# Patient Record
Sex: FEMALE | Race: WHITE | Employment: OTHER | URBAN - METROPOLITAN AREA
[De-identification: names, ages, dates, MRNs, and addresses within clinical notes are randomized per-mention and may not be internally consistent; named-entity substitution may affect disease eponyms.]

---

## 2017-09-26 ENCOUNTER — ESTABLISHED COMPREHENSIVE EXAM (OUTPATIENT)
Dept: URBAN - METROPOLITAN AREA CLINIC 36 | Facility: CLINIC | Age: 72
End: 2017-09-26

## 2017-09-26 DIAGNOSIS — H25.813: ICD-10-CM

## 2017-09-26 DIAGNOSIS — H52.7: ICD-10-CM

## 2017-09-26 PROCEDURE — 92014 COMPRE OPH EXAM EST PT 1/>: CPT

## 2017-09-26 PROCEDURE — 92310-1 LEVEL 1 CONTACT LENS MANAGEMENT

## 2017-09-26 PROCEDURE — G8427 DOCREV CUR MEDS BY ELIG CLIN: HCPCS

## 2017-09-26 PROCEDURE — G8785 BP SCRN NO PERF AT INTERVAL: HCPCS

## 2017-09-26 PROCEDURE — 92015 DETERMINE REFRACTIVE STATE: CPT

## 2017-09-26 PROCEDURE — 92310PEW PREMIUM SPECIALTY EXTENDED WEAR

## 2017-09-26 ASSESSMENT — VISUAL ACUITY
OS_CC: J2
OD_SC: J10
OS_SC: 20/40
OD_PH: 20/50
OS_SC: J12
OD_SC: 20/40-2
OD_CC: 20/100
OS_CC: 20/50
OD_CC: J3

## 2017-09-26 ASSESSMENT — TONOMETRY
OD_IOP_MMHG: 19
OS_IOP_MMHG: 18

## 2018-09-25 ENCOUNTER — ESTABLISHED COMPREHENSIVE EXAM (OUTPATIENT)
Dept: URBAN - METROPOLITAN AREA CLINIC 36 | Facility: CLINIC | Age: 73
End: 2018-09-25

## 2018-09-25 DIAGNOSIS — H25.812: ICD-10-CM

## 2018-09-25 DIAGNOSIS — H25.811: ICD-10-CM

## 2018-09-25 PROCEDURE — 92014 COMPRE OPH EXAM EST PT 1/>: CPT

## 2018-09-25 PROCEDURE — G8785 BP SCRN NO PERF AT INTERVAL: HCPCS

## 2018-09-25 PROCEDURE — G8427 DOCREV CUR MEDS BY ELIG CLIN: HCPCS

## 2018-09-25 PROCEDURE — G9902 PT SCRN TBCO AND ID AS USER: HCPCS

## 2018-09-25 PROCEDURE — 92310-1 LEVEL 1 CONTACT LENS MANAGEMENT

## 2018-09-25 PROCEDURE — 92310SDW STANDARD DAILY WEAR

## 2018-09-25 PROCEDURE — 92015 DETERMINE REFRACTIVE STATE: CPT

## 2018-09-25 ASSESSMENT — VISUAL ACUITY
OS_SC: J10
OS_CC: J2
OD_PH: 20/50
OD_SC: 20/60
OS_SC: 20/40
OS_PH: 20/40
OD_CC: 20/100
OD_CC: J2
OD_SC: J10
OS_CC: 20/60

## 2018-09-25 ASSESSMENT — TONOMETRY
OS_IOP_MMHG: 16
OD_IOP_MMHG: 16

## 2018-10-02 ENCOUNTER — CONTACT LENS FOLLOW UP (OUTPATIENT)
Dept: URBAN - METROPOLITAN AREA CLINIC 36 | Facility: CLINIC | Age: 73
End: 2018-10-02

## 2018-10-02 DIAGNOSIS — H52.7: ICD-10-CM

## 2018-10-02 PROCEDURE — 92310F

## 2018-10-02 ASSESSMENT — VISUAL ACUITY
OD_CC: J1
OS_CC: J3
OD_CC: 20/40
OS_CC: 20/25

## 2019-10-14 ENCOUNTER — ESTABLISHED COMPREHENSIVE EXAM (OUTPATIENT)
Dept: URBAN - METROPOLITAN AREA CLINIC 36 | Facility: CLINIC | Age: 74
End: 2019-10-14

## 2019-10-14 DIAGNOSIS — H25.811: ICD-10-CM

## 2019-10-14 DIAGNOSIS — H25.812: ICD-10-CM

## 2019-10-14 DIAGNOSIS — H52.7: ICD-10-CM

## 2019-10-14 DIAGNOSIS — Z97.3: ICD-10-CM

## 2019-10-14 PROCEDURE — 92015 DETERMINE REFRACTIVE STATE: CPT

## 2019-10-14 PROCEDURE — 92014 COMPRE OPH EXAM EST PT 1/>: CPT

## 2019-10-14 PROCEDURE — 92310PEW PREMIUM SPECIALTY EXTENDED WEAR

## 2019-10-14 PROCEDURE — 92310-1 LEVEL 1 CONTACT LENS MANAGEMENT

## 2019-10-14 ASSESSMENT — VISUAL ACUITY
OD_SC: >J10
OS_PH: 20/25-2
OS_SC: 20/30-2
OD_PH: 20/20-2
OS_SC: >J10
OD_SC: 20/40

## 2019-10-14 ASSESSMENT — TONOMETRY
OD_IOP_MMHG: 16
OS_IOP_MMHG: 18

## 2020-03-05 ENCOUNTER — EST. PATIENT EMERGENCY (OUTPATIENT)
Dept: URBAN - METROPOLITAN AREA CLINIC 36 | Facility: CLINIC | Age: 75
End: 2020-03-05

## 2020-03-05 DIAGNOSIS — B02.39: ICD-10-CM

## 2020-03-05 PROCEDURE — 92012 INTRM OPH EXAM EST PATIENT: CPT

## 2020-03-05 ASSESSMENT — TONOMETRY
OD_IOP_MMHG: 16
OS_IOP_MMHG: 16

## 2020-03-05 ASSESSMENT — VISUAL ACUITY
OD_SC: 20/40-1
OS_SC: 20/50
OD_PH: 20/30
OS_PH: 20/30

## 2020-10-19 ENCOUNTER — ESTABLISHED COMPREHENSIVE EXAM (OUTPATIENT)
Dept: URBAN - METROPOLITAN AREA CLINIC 36 | Facility: CLINIC | Age: 75
End: 2020-10-19

## 2020-10-19 DIAGNOSIS — H52.7: ICD-10-CM

## 2020-10-19 DIAGNOSIS — H25.811: ICD-10-CM

## 2020-10-19 DIAGNOSIS — H25.812: ICD-10-CM

## 2020-10-19 DIAGNOSIS — Z97.3: ICD-10-CM

## 2020-10-19 PROCEDURE — 92310-1 LEVEL 1 CONTACT LENS MANAGEMENT

## 2020-10-19 PROCEDURE — 92310PEW PREMIUM SPECIALTY EXTENDED WEAR

## 2020-10-19 PROCEDURE — 92015 DETERMINE REFRACTIVE STATE: CPT

## 2020-10-19 PROCEDURE — 92014 COMPRE OPH EXAM EST PT 1/>: CPT

## 2020-10-19 ASSESSMENT — TONOMETRY
OS_IOP_MMHG: 16
OD_IOP_MMHG: 16

## 2020-10-19 ASSESSMENT — VISUAL ACUITY
OD_SC: 20/40-1
OS_SC: 20/40
OD_SC: J>10
OS_SC: J>10
OS_PH: 20/30
OD_PH: 20/30-1

## 2020-11-10 ENCOUNTER — CATARACT CONSULT (OUTPATIENT)
Dept: URBAN - METROPOLITAN AREA CLINIC 43 | Facility: CLINIC | Age: 75
End: 2020-11-10

## 2020-11-10 DIAGNOSIS — H25.811: ICD-10-CM

## 2020-11-10 DIAGNOSIS — H18.593: ICD-10-CM

## 2020-11-10 DIAGNOSIS — H04.123: ICD-10-CM

## 2020-11-10 DIAGNOSIS — H25.812: ICD-10-CM

## 2020-11-10 DIAGNOSIS — H18.513: ICD-10-CM

## 2020-11-10 PROCEDURE — 92286 ANT SGM IMG I&R SPECLR MIC: CPT

## 2020-11-10 PROCEDURE — 99214 OFFICE O/P EST MOD 30 MIN: CPT

## 2020-11-10 PROCEDURE — 92025-2 CORNEAL TOPOGRAPHY, PT

## 2020-11-10 PROCEDURE — 92136TC INTERFEROMETRY - TECHNICAL COMPONENT

## 2020-11-10 RX ORDER — MOXIFLOXACIN HYDROCHLORIDE 5 MG/ML: 1 SOLUTION/ DROPS OPHTHALMIC

## 2020-11-10 RX ORDER — DUREZOL 0.5 MG/ML: 1 EMULSION OPHTHALMIC TWICE A DAY

## 2020-11-10 RX ORDER — NEPAFENAC 3 MG/ML: 1 SUSPENSION/ DROPS OPHTHALMIC ONCE A DAY

## 2020-11-10 ASSESSMENT — TONOMETRY
OD_IOP_MMHG: 15
OS_IOP_MMHG: 14

## 2020-11-10 ASSESSMENT — VISUAL ACUITY
OS_SC: J12
OD_BAT: 20/200
OS_AM: 20/20
OD_CC: J2
OD_SC: J12
OS_CC: J1
OS_BAT: 20/70
OD_RAM: 20/20-2
OD_PH: 20/30-1
OD_SC: 20/50
OS_SC: 20/50-1

## 2020-11-23 ENCOUNTER — DIAGNOSTICS ONLY (OUTPATIENT)
Dept: URBAN - METROPOLITAN AREA CLINIC 39 | Facility: CLINIC | Age: 75
End: 2020-11-23

## 2020-11-23 DIAGNOSIS — H25.811: ICD-10-CM

## 2020-11-23 DIAGNOSIS — H25.812: ICD-10-CM

## 2020-11-23 PROCEDURE — 92025NC COMP. CORNEAL TOPO, UNI OR BILAT,

## 2020-11-23 PROCEDURE — 99211T TECH SERVICE

## 2020-11-23 PROCEDURE — 92136TCNC INTERFEROMETRY -TECHNICAL COMPONENT NO CHARGE

## 2020-12-02 ENCOUNTER — PRE-OP/H&P (OUTPATIENT)
Dept: URBAN - METROPOLITAN AREA CLINIC 39 | Facility: CLINIC | Age: 75
End: 2020-12-02

## 2020-12-02 ENCOUNTER — SURGERY/PROCEDURE (OUTPATIENT)
Dept: URBAN - METROPOLITAN AREA CLINIC 43 | Facility: CLINIC | Age: 75
End: 2020-12-02

## 2020-12-02 DIAGNOSIS — H25.811: ICD-10-CM

## 2020-12-02 PROCEDURE — 66984AV REMOVE CATARACT, INSERT ADVANCED LENS

## 2020-12-02 PROCEDURE — 99211T TECH SERVICE

## 2020-12-02 PROCEDURE — 66999LNSR LENSAR LASER FOR CAT SX

## 2020-12-02 PROCEDURE — 65772LRI LRI DURING CAT SX

## 2020-12-03 ENCOUNTER — POST OP/EVAL OF SECOND EYE (OUTPATIENT)
Dept: URBAN - METROPOLITAN AREA CLINIC 36 | Facility: CLINIC | Age: 75
End: 2020-12-03

## 2020-12-03 DIAGNOSIS — H18.593: ICD-10-CM

## 2020-12-03 DIAGNOSIS — H25.812: ICD-10-CM

## 2020-12-03 DIAGNOSIS — H18.513: ICD-10-CM

## 2020-12-03 DIAGNOSIS — Z96.1: ICD-10-CM

## 2020-12-03 DIAGNOSIS — H04.123: ICD-10-CM

## 2020-12-03 PROCEDURE — 92012 INTRM OPH EXAM EST PATIENT: CPT

## 2020-12-03 PROCEDURE — 99024 POSTOP FOLLOW-UP VISIT: CPT

## 2020-12-03 ASSESSMENT — VISUAL ACUITY
OS_SC: J10
OD_SC: J10
OS_SC: 20/50
OD_SC: 20/25

## 2020-12-03 ASSESSMENT — TONOMETRY
OD_IOP_MMHG: 22
OS_IOP_MMHG: 15

## 2020-12-07 ENCOUNTER — PRE-OP/H&P (OUTPATIENT)
Dept: URBAN - METROPOLITAN AREA CLINIC 39 | Facility: CLINIC | Age: 75
End: 2020-12-07

## 2020-12-07 ENCOUNTER — SURGERY/PROCEDURE (OUTPATIENT)
Dept: URBAN - METROPOLITAN AREA CLINIC 43 | Facility: CLINIC | Age: 75
End: 2020-12-07

## 2020-12-07 DIAGNOSIS — H25.812: ICD-10-CM

## 2020-12-07 DIAGNOSIS — Z96.1: ICD-10-CM

## 2020-12-07 PROCEDURE — 66999LNSR LENSAR LASER FOR CAT SX

## 2020-12-07 PROCEDURE — 99211T TECH SERVICE

## 2020-12-07 PROCEDURE — 65772LRI LRI DURING CAT SX

## 2020-12-07 PROCEDURE — 66984AV REMOVE CATARACT, INSERT ADVANCED LENS

## 2020-12-07 ASSESSMENT — TONOMETRY
OD_IOP_MMHG: 14
OS_IOP_MMHG: 16

## 2020-12-07 ASSESSMENT — VISUAL ACUITY
OD_SC: J7
OD_SC: 20/20-1
OS_SC: J10
OS_SC: 20/50

## 2020-12-08 ENCOUNTER — CATARACT POST-OP 1-DAY (OUTPATIENT)
Dept: URBAN - METROPOLITAN AREA CLINIC 36 | Facility: CLINIC | Age: 75
End: 2020-12-08

## 2020-12-08 DIAGNOSIS — Z96.1: ICD-10-CM

## 2020-12-08 PROCEDURE — 99024 POSTOP FOLLOW-UP VISIT: CPT

## 2020-12-08 ASSESSMENT — TONOMETRY
OS_IOP_MMHG: 30
OS_IOP_MMHG: 25
OD_IOP_MMHG: 30
OD_IOP_MMHG: 17

## 2020-12-08 ASSESSMENT — VISUAL ACUITY
OS_SC: J8
OD_SC: 20/20
OD_SC: J5
OS_SC: 20/70
OS_PH: 20/40

## 2020-12-18 ENCOUNTER — POST-OP (OUTPATIENT)
Dept: URBAN - METROPOLITAN AREA CLINIC 36 | Facility: CLINIC | Age: 75
End: 2020-12-18

## 2020-12-18 DIAGNOSIS — Z96.1: ICD-10-CM

## 2020-12-18 PROCEDURE — 99024 POSTOP FOLLOW-UP VISIT: CPT

## 2020-12-18 RX ORDER — TIMOLOL MALEATE 6.8 MG/ML: 1 SOLUTION OPHTHALMIC TWICE A DAY

## 2020-12-18 ASSESSMENT — VISUAL ACUITY
OS_SC: J1
OD_SC: J3
OS_SC: 20/25
OD_SC: 20/20

## 2020-12-18 ASSESSMENT — TONOMETRY
OS_IOP_MMHG: 30
OD_IOP_MMHG: 20

## 2021-01-06 ENCOUNTER — POST-OP (OUTPATIENT)
Dept: URBAN - METROPOLITAN AREA CLINIC 36 | Facility: CLINIC | Age: 76
End: 2021-01-06

## 2021-01-06 DIAGNOSIS — Z96.1: ICD-10-CM

## 2021-01-06 PROCEDURE — 99024 POSTOP FOLLOW-UP VISIT: CPT

## 2021-01-06 ASSESSMENT — VISUAL ACUITY
OD_SC: 20/20-1
OS_SC: J1
OD_SC: J5
OS_SC: 20/25-1

## 2021-01-06 ASSESSMENT — TONOMETRY
OS_IOP_MMHG: 20
OD_IOP_MMHG: 20

## 2021-02-18 ENCOUNTER — COSMETIC CONSULT (OUTPATIENT)
Dept: URBAN - METROPOLITAN AREA CLINIC 44 | Facility: CLINIC | Age: 76
End: 2021-02-18

## 2021-02-18 DIAGNOSIS — H02.832: ICD-10-CM

## 2021-02-18 DIAGNOSIS — L98.8: ICD-10-CM

## 2021-02-18 DIAGNOSIS — H02.835: ICD-10-CM

## 2021-02-18 DIAGNOSIS — H02.831: ICD-10-CM

## 2021-02-18 DIAGNOSIS — H02.834: ICD-10-CM

## 2021-02-18 PROCEDURE — 99213 OFFICE O/P EST LOW 20 MIN: CPT

## 2021-02-18 PROCEDURE — 92285 EXTERNAL OCULAR PHOTOGRAPHY: CPT

## 2021-02-18 ASSESSMENT — VISUAL ACUITY
OD_OTHER: 10 FEET
OS_SC: 20/25
OD_SC: 20/20

## 2021-02-22 ENCOUNTER — DIAGNOSTICS ONLY (OUTPATIENT)
Dept: URBAN - METROPOLITAN AREA CLINIC 36 | Facility: CLINIC | Age: 76
End: 2021-02-22

## 2021-02-22 DIAGNOSIS — H02.834: ICD-10-CM

## 2021-02-22 DIAGNOSIS — H02.831: ICD-10-CM

## 2021-02-22 PROCEDURE — 99211T TECH SERVICE

## 2021-02-22 PROCEDURE — 92082 INTERMEDIATE VISUAL FIELD XM: CPT

## 2021-05-13 ENCOUNTER — ESTABLISHED COMPREHENSIVE EXAM (OUTPATIENT)
Dept: URBAN - METROPOLITAN AREA CLINIC 36 | Facility: CLINIC | Age: 76
End: 2021-05-13

## 2021-05-13 DIAGNOSIS — H26.491: ICD-10-CM

## 2021-05-13 DIAGNOSIS — H18.593: ICD-10-CM

## 2021-05-13 DIAGNOSIS — H18.513: ICD-10-CM

## 2021-05-13 DIAGNOSIS — H26.492: ICD-10-CM

## 2021-05-13 DIAGNOSIS — H04.123: ICD-10-CM

## 2021-05-13 DIAGNOSIS — H52.7: ICD-10-CM

## 2021-05-13 DIAGNOSIS — H40.052: ICD-10-CM

## 2021-05-13 PROCEDURE — 92014 COMPRE OPH EXAM EST PT 1/>: CPT

## 2021-05-13 PROCEDURE — 92015GRNC REFRACTION GLASSES RECHECK - NO CHARGE

## 2021-05-13 ASSESSMENT — VISUAL ACUITY
OD_SC: J5
OD_SC: 20/25
OS_SC: J2
OS_SC: 20/25-1

## 2021-05-13 ASSESSMENT — TONOMETRY
OD_IOP_MMHG: 20
OS_IOP_MMHG: 20

## 2021-05-20 ENCOUNTER — PRE-OP/H&P (OUTPATIENT)
Dept: URBAN - METROPOLITAN AREA CLINIC 44 | Facility: CLINIC | Age: 76
End: 2021-05-20

## 2021-05-20 DIAGNOSIS — H02.835: ICD-10-CM

## 2021-05-20 DIAGNOSIS — H02.834: ICD-10-CM

## 2021-05-20 DIAGNOSIS — H02.832: ICD-10-CM

## 2021-05-20 DIAGNOSIS — H02.831: ICD-10-CM

## 2021-05-20 DIAGNOSIS — Z96.1: ICD-10-CM

## 2021-05-20 DIAGNOSIS — L98.8: ICD-10-CM

## 2021-05-20 PROCEDURE — 99211HP H&P OFFICE/OUTPATIENT VISIT, EST

## 2021-05-20 RX ORDER — ERYTHROMYCIN 5 MG/G
OINTMENT OPHTHALMIC
Start: 2021-06-01

## 2021-05-20 RX ORDER — TOBRAMYCIN AND DEXAMETHASONE 1; 3 MG/ML; MG/ML
1 SUSPENSION/ DROPS OPHTHALMIC
Start: 2021-06-01

## 2021-06-01 ENCOUNTER — SURGERY/PROCEDURE (OUTPATIENT)
Dept: URBAN - METROPOLITAN AREA SURGERY 14 | Facility: SURGERY | Age: 76
End: 2021-06-01

## 2021-06-01 ENCOUNTER — PRE-OP/H&P (OUTPATIENT)
Dept: URBAN - METROPOLITAN AREA SURGERY 14 | Facility: SURGERY | Age: 76
End: 2021-06-01

## 2021-06-01 DIAGNOSIS — H02.831: ICD-10-CM

## 2021-06-01 DIAGNOSIS — H02.834: ICD-10-CM

## 2021-06-01 DIAGNOSIS — Z41.1: ICD-10-CM

## 2021-06-01 DIAGNOSIS — H02.835: ICD-10-CM

## 2021-06-01 DIAGNOSIS — H02.832: ICD-10-CM

## 2021-06-01 PROCEDURE — 15821 BLEPHARP LWR EYELID FAT PAD: CPT

## 2021-06-01 PROCEDURE — 1582350 UPPER BLEPH PER EYE FUNCTIONAL-BILATERAL

## 2021-06-01 PROCEDURE — 99499 UNLISTED E&M SERVICE: CPT

## 2021-06-02 ENCOUNTER — 1 DAY POST-OP (OUTPATIENT)
Dept: URBAN - METROPOLITAN AREA CLINIC 39 | Facility: CLINIC | Age: 76
End: 2021-06-02

## 2021-06-02 DIAGNOSIS — Z98.890: ICD-10-CM

## 2021-06-02 PROCEDURE — 99024 POSTOP FOLLOW-UP VISIT: CPT

## 2021-06-02 ASSESSMENT — VISUAL ACUITY
OS_SC: 20/200
OD_SC: 20/30-1
OS_PH: 20/200

## 2021-06-08 ENCOUNTER — POST-OP (OUTPATIENT)
Dept: URBAN - METROPOLITAN AREA CLINIC 39 | Facility: CLINIC | Age: 76
End: 2021-06-08

## 2021-06-08 DIAGNOSIS — L98.8: ICD-10-CM

## 2021-06-08 DIAGNOSIS — Z98.890: ICD-10-CM

## 2021-06-08 PROCEDURE — 99024 POSTOP FOLLOW-UP VISIT: CPT

## 2021-06-08 ASSESSMENT — VISUAL ACUITY
OD_SC: 20/30-2
OS_SC: 20/40

## 2021-06-10 NOTE — PATIENT DISCUSSION
ed with Hx amblyopia I would expect we could get her 80-90% freedom from glasses with ADV IOL OD and CV IOL OS.

## 2021-07-01 ENCOUNTER — POST-OP (OUTPATIENT)
Dept: URBAN - METROPOLITAN AREA CLINIC 36 | Facility: CLINIC | Age: 76
End: 2021-07-01

## 2021-07-01 DIAGNOSIS — Z98.890: ICD-10-CM

## 2021-07-01 DIAGNOSIS — D49.2: ICD-10-CM

## 2021-07-01 DIAGNOSIS — L98.8: ICD-10-CM

## 2021-07-01 PROCEDURE — 67840 REMOVE EYELID LESION: CPT

## 2021-07-01 PROCEDURE — 99024 POSTOP FOLLOW-UP VISIT: CPT

## 2021-07-01 ASSESSMENT — VISUAL ACUITY
OD_SC: 20/30
OS_SC: 20/30-1

## 2022-06-06 ENCOUNTER — COMPREHENSIVE EXAM (OUTPATIENT)
Dept: URBAN - METROPOLITAN AREA CLINIC 36 | Facility: CLINIC | Age: 77
End: 2022-06-06

## 2022-06-06 DIAGNOSIS — H18.593: ICD-10-CM

## 2022-06-06 DIAGNOSIS — H18.513: ICD-10-CM

## 2022-06-06 DIAGNOSIS — H26.493: ICD-10-CM

## 2022-06-06 DIAGNOSIS — H04.123: ICD-10-CM

## 2022-06-06 PROCEDURE — 92014 COMPRE OPH EXAM EST PT 1/>: CPT

## 2022-06-06 ASSESSMENT — TONOMETRY
OD_IOP_MMHG: 16
OS_IOP_MMHG: 14

## 2022-06-06 ASSESSMENT — VISUAL ACUITY
OD_SC: J2
OD_SC: 20/25
OS_SC: J1
OS_SC: 20/30-1

## 2023-09-05 NOTE — PATIENT DISCUSSION
Clinic hours for Dr. Carlos:  Monday 8am - 5pm  Tuesday 9am - 5pm  Wednesday 8am - 12pm  Thursday 9am - 5pm   Friday 8am - 12pm    If you need a refill on your prescription, please call your pharmacy and let them know. Please be proactive and call before your medication runs out. The pharmacy will then contact us for the refill. Please allow 24-48 hours for the refill to be processed.     If your physician has ordered additional laboratory or radiology testing as part of your ongoing plan of care, please allow 5-7 business days from the day of your lab draw or test for the results to be sent and reviewed by your provider. If your results are critical and require more immediate intervention, you will be contacted sooner. Your results will be conveyed to you via a phone call or letter.    You may be receiving a patient satisfaction survey in the mail or in your email. If you receive an email survey, please look for the subject line of: \" Your provider name\" would like your feedback\". Please take the time to complete your survey either via the mail or email, as your feedback is very important to us. We strive to make your experience exceptional and your comments help us with that goal. We look forward to hearing from you.          Recommended artificial tears to use: 1 drop 4x a day in both eyes.

## 2023-09-19 ENCOUNTER — ESTABLISHED PATIENT (OUTPATIENT)
Dept: URBAN - METROPOLITAN AREA CLINIC 36 | Facility: CLINIC | Age: 78
End: 2023-09-19

## 2023-09-19 DIAGNOSIS — H18.593: ICD-10-CM

## 2023-09-19 DIAGNOSIS — H04.123: ICD-10-CM

## 2023-09-19 DIAGNOSIS — H26.493: ICD-10-CM

## 2023-09-19 DIAGNOSIS — H18.513: ICD-10-CM

## 2023-09-19 PROCEDURE — 92014 COMPRE OPH EXAM EST PT 1/>: CPT

## 2023-09-19 ASSESSMENT — TONOMETRY
OD_IOP_MMHG: 16
OS_IOP_MMHG: 15

## 2023-09-19 ASSESSMENT — VISUAL ACUITY
OS_SC: J1
OU_SC: 20/20-2
OD_SC: J3
OU_SC: J1
OS_SC: 20/30-2
OD_SC: 20/25

## 2024-11-01 ENCOUNTER — COMPREHENSIVE EXAM (OUTPATIENT)
Dept: URBAN - METROPOLITAN AREA CLINIC 36 | Facility: CLINIC | Age: 79
End: 2024-11-01

## 2024-11-01 DIAGNOSIS — H26.493: ICD-10-CM

## 2024-11-01 DIAGNOSIS — H18.593: ICD-10-CM

## 2024-11-01 DIAGNOSIS — H04.123: ICD-10-CM

## 2024-11-01 DIAGNOSIS — H18.513: ICD-10-CM

## 2024-11-01 PROCEDURE — 99213 OFFICE O/P EST LOW 20 MIN: CPT
